# Patient Record
Sex: FEMALE | Race: OTHER | HISPANIC OR LATINO | URBAN - METROPOLITAN AREA
[De-identification: names, ages, dates, MRNs, and addresses within clinical notes are randomized per-mention and may not be internally consistent; named-entity substitution may affect disease eponyms.]

---

## 2020-09-10 ENCOUNTER — EMERGENCY (EMERGENCY)
Facility: HOSPITAL | Age: 40
LOS: 1 days | Discharge: HOME | End: 2020-09-10
Attending: EMERGENCY MEDICINE | Admitting: EMERGENCY MEDICINE
Payer: COMMERCIAL

## 2020-09-10 VITALS
SYSTOLIC BLOOD PRESSURE: 148 MMHG | HEIGHT: 65 IN | OXYGEN SATURATION: 99 % | HEART RATE: 111 BPM | TEMPERATURE: 98 F | WEIGHT: 205.03 LBS | RESPIRATION RATE: 18 BRPM | DIASTOLIC BLOOD PRESSURE: 103 MMHG

## 2020-09-10 VITALS
RESPIRATION RATE: 18 BRPM | HEART RATE: 90 BPM | OXYGEN SATURATION: 98 % | SYSTOLIC BLOOD PRESSURE: 132 MMHG | DIASTOLIC BLOOD PRESSURE: 88 MMHG

## 2020-09-10 PROCEDURE — 99242 OFF/OP CONSLTJ NEW/EST SF 20: CPT

## 2020-09-10 PROCEDURE — 99283 EMERGENCY DEPT VISIT LOW MDM: CPT

## 2020-09-10 NOTE — ED PROVIDER NOTE - NSFOLLOWUPINSTRUCTIONS_ED_ALL_ED_FT
Nondisplaced Fibular Ankle Fracture Treated With Immobilization, Adult     A nondisplaced fibular ankle fracture is a simple break of the bottom of the fibula (lateral malleolus). The fibula is a bone in the lower leg, between the knee and the foot. In a nondisplaced fracture, the pieces of the broken bone line up with each other and are not out of place. This condition usually does not need surgery and can be treated with a splint or cast.  What are the causes?  This condition may be caused by:  A hard, direct hit (blow) or injury to the side of the leg.A powerful twisting or rotating movement.Rolling the ankle.Falling or tripping.What increases the risk?  You are more likely to develop this condition if:  You play sports that involve a lot of running and pivoting, such as basketball.You play impact sports, such as football or soccer.You smoke.You have diabetes.You have a history of ankle fractures.You are obese.What are the signs or symptoms?  Symptoms of this condition include:  Severe pain that begins immediately after the injury.Bruising.Swelling.Inability to put weight on the injured ankle.An ankle that is tender to the touch.How is this diagnosed?  This condition is diagnosed based on:  Your medical history.A physical exam.Imaging tests to confirm the fracture and to evaluate the extent of the injury. These tests may include:  X-rays.Stress X-ray. During this test, your health care provider will put pressure on your ankle while taking an X-ray. This will help to determine whether your ankle is stable.CT scan.MRI.How is this treated?  This condition may be treated with:  A splint.Icing and raising (elevating) the ankle.A cast.A removable cast or walking "boot."Crutches. These may be needed to help you get around.Follow these instructions at home:  Managing pain, stiffness, and swelling        If directed, put ice on the injured area.  If you have a removable splint or cast, remove it as told by your health care provider.Put ice in a plastic bag.Place a towel between your skin and the bag, or between your cast and the bag.Leave the ice on for 20 minutes, 2–3 times a day.Raise (elevate) the injured area above the level of your heart while you are sitting or lying down.Move your toes often to avoid stiffness and to lessen swelling.Use crutches as directed. Resume walking without crutches as directed by your health care provider or when you are comfortable doing that.If you have a removable splint or cast:     Wear the removable splint or cast as told by your health care provider. Remove it only as told by your health care provider. Loosen the splint or cast if your toes tingle, become numb, or turn cold and blue.Keep the splint or cast clean.If the splint or cast is not waterproof:  Do not let it get wet.Cover it with a watertight covering when you take a bath or a shower.If you have a cast that cannot be removed:     Do not stick anything inside the cast to scratch your skin. Doing that increases your risk of infection.Check the skin around the cast every day. Contact your health care provider if you notice any redness, irritation, or swelling. You may put lotion on dry skin around the edges of the cast. Do not put lotion on the skin underneath the cast. Keep the cast clean.Do not break off edges or trim your cast.If the cast is not waterproof:  Do not let it get wet.Cover it with a watertight covering when you take a bath or a shower.Activity     Do exercises and stretches as told by your health care provider.Ask your health care provider when it is safe to drive if you have a cast or splint on your ankle.Do not drive or use heavy machinery while taking prescription pain medicine.General instructions        Take over-the-counter and prescription medicines only as told by your health care provider.Do not take baths, swim, or use a hot tub until your health care provider approves. Ask your health care provider if you can take showers. You may only be allowed to take sponge baths for bathing.Do not use the injured leg to support your body weight until your health care provider says that you can. Use crutches as told by your health care provider.Do not use any products that contain nicotine or tobacco, such as cigarettes and e-cigarettes. These can delay bone healing. If you need help quitting, ask your health care provider.Keep all follow-up visits as told by your health care provider. This is important.Contact a health care provider if:  Your cast gets damaged or it breaks.Your pain does not get better with medicine.Get help right away if:  You develop severe pain or more swelling in your ankle or foot that cannot be controlled with medicines.Your skin or nails below the injury turn blue or gray, feel cold, or become numb.The skin under your cast burns or stings.There is a bad smell or pus coming from under the cast.You cannot move your toes.Summary  A nondisplaced fibular ankle fracture is a simple break of the bottom of a bone in the lower leg (fibula).This condition may be treated with a splint, icing and elevation, a cast, or a removable cast or walking "boot." You may also need crutches to get around while your ankle heals.To help manage pain, stiffness, swelling, put ice on the injured area as directed by your health care provider.You should not use the injured leg to support your body weight until your health care provider says that you can. Use crutches as told by your health care provider.This information is not intended to replace advice given to you by your health care provider. Make sure you discuss any questions you have with your health care provider.

## 2020-09-10 NOTE — ED PROVIDER NOTE - OBJECTIVE STATEMENT
39 y/o F with PMHx DM (on metformin and insulin), presents to the ED c/o right ankle pain x 2 weeks s/p mechanical fall. Pt states she fell down a couple of stairs 2 weeks ago and was ambulating after the fall, and has been walking since. Denies head injury when she fell. States the pain increased and went to [facility] for evaluation where she had an xr done, was told she has a fracture, and sent here for further evaluation. Currently rates her pain 4/10. Pain is located along the lateral aspect of the right ankle, with some pain radiating into the right shin. Denies fevers, chills, numbness, tingling, changes in temperature or sensation of the right extremity, dizziness, or weakness. 41 y/o F with PMHx DM (on metformin and insulin), presents to the ED c/o right ankle pain x 2 weeks s/p mechanical fall. Pt states she fell down a couple of stairs 2 weeks ago and was ambulating after the fall, and has been walking since. Denies head injury when she fell. States the pain worsened which prompted her to go to [facility] for evaluation. Pt had an xr done, was told she has a fracture, and was sent here for further evaluation. Currently rates her pain 4/10. Pain is located along the lateral aspect of the right ankle with some pain radiating into the right shin. Denies fevers, chills, numbness, tingling, changes in temperature or sensation of the right extremity, dizziness, or weakness.

## 2020-09-10 NOTE — ED PROVIDER NOTE - CARE PROVIDER_API CALL
Hal Phillips  ORTHOPAEDIC SURGERY  [address] [address]  Phone: [phone]  Fax: [phone]  Follow Up Time:

## 2020-09-10 NOTE — ED PROVIDER NOTE - CLINICAL SUMMARY MEDICAL DECISION MAKING FREE TEXT BOX
39 y/o F with PMHx DM presents to the ED with right ankle pain s/p mechanical fall 2 weeks ago. Pt has been ambulating with pain for the past 2 weeks. Seen at [facility] earlier today, where an XR was done which showed fx of the distal fibula w/ slight displacement. Pt transferred to the ED to be seen by orthopedics. ANA WARNER. Waiting for ortho evaluation and dispo.

## 2020-09-10 NOTE — ED PROVIDER NOTE - ATTENDING CONTRIBUTION TO CARE
40F pmh DM p/w ankl pain s/p mech fall 2wk pta, ambulating. Exam w/ ankle ttp, NV intact. Concern fx, NV itact, no e/o sig dislocation

## 2020-09-10 NOTE — ED PROVIDER NOTE - PATIENT PORTAL LINK FT
You can access the FollowMyHealth Patient Portal offered by [facility] by registering at the following website: [email or web address]. By joining [email or web address]’s FollowMyHealth portal, you will also be able to view your health information using other applications (apps) compatible with our system.

## 2020-09-10 NOTE — CONSULT NOTE ADULT - SUBJECTIVE AND OBJECTIVE BOX
Orthopaedic Surgery Consult Note    For Surgeon: Dr. Funez     HPI:  40yFemale  Patient is a 40y old  Female who presents with a chief complaint of right ankle pain     HPI: Patient is 41 y/o female who reports h/o mechanical fall onto right ankle 2 weeks ago. Patient reports slip and fall down stairs and twisting her ankle. Endorses pain laterally but notes she has been able to walk on it since. she reports associated bruising, swelling at the right ankle. Patient has been treating it with ice and elevation since. Given that pain has not improved she presented to urgent care where XR revealed fracture of the fibula. Patient was then sent to ED for further work up. She denies associated numbness/tingling. Denies injury to other extremities.       Allergies    No Known Allergies    Intolerances      PAST MEDICAL & SURGICAL HISTORY:  Diabetes mellitus  No significant past surgical history    MEDICATIONS  (STANDING):    MEDICATIONS  (PRN):      Vital Signs Last 24 Hrs  T(C): 36.9 (10 Sep 2020 12:30), Max: 36.9 (10 Sep 2020 12:30)  T(F): 98.4 (10 Sep 2020 12:30), Max: 98.4 (10 Sep 2020 12:30)  HR: 90 (10 Sep 2020 12:44) (90 - 111)  BP: 132/88 (10 Sep 2020 12:44) (132/88 - 148/103)  BP(mean): --  RR: 18 (10 Sep 2020 12:44) (18 - 18)  SpO2: 98% (10 Sep 2020 12:44) (98% - 99%)    Physical Exam:    Gen: 41 y/o female, well nourished, well developed, NAD  HEENT: atraumatic, normocephalic   RESP: normal effort on room air  CV: no peripheral edema, no cyanosis, peripheral pulses present   MSK: Right ankle reveals diffuse swelling  + resolving ecchymosis right calf/right toes  + tenderness lateral distal fibula/lateral malleolus   no tenderness medial malleolus  no tenderness with inversion of the ankle   calves soft, nontender bilaterally   sensation intact to light touch bilateral lower extremities  DP 2+,  brisk capillary refill  EHL/FHL/TA/GS 5/5 bilaterally               Imaging: Right ankle XR reveals oblique fracture distal fibula with mild displacement     A/P: 40yFemale with right distal fibular fracture    Patient seen and examined in the ED by myself and Dr Funez   non operative management  camwalker  WBAT RLE with crutches for support  continue supportive care with ice/elevation to decrease swelling  OK for work   outpatient f/u with Dr Funez in 3 weeks     -Discussed with Dr. Funez     Ortho Pager [phone]

## 2020-09-10 NOTE — ED ADULT NURSE NOTE - CHIEF COMPLAINT QUOTE
pt [facility] ED [facility] of [facility] c/c RLE pain. per pt, fell x2 weeks ago and has been ambulating with OTC splint. no head injury or LOC at time of fall. [facility] with confirmed distal fibula fx. airsplint applied and crutches given, referred to [facility] for ortho and splint. coloration normal, skin warm, cap refill <3secs, sensation intact. no obvious deformity noted.

## 2020-09-10 NOTE — ED ADULT TRIAGE NOTE - CHIEF COMPLAINT QUOTE
pt arriving to ED per referral of [facility]. c/c RLE pain. per pt, fell x2 weeks ago and has been ambulating with OTC splint. pt [facility] at [facility] with confirmed distal fibula fx. airsplint applied and crutches given, referred to [facility] for ortho and splint. coloration normal, skin warm, cap refill <3secs, sensation intact. pt arriving to ED per referral of [facility]. c/c RLE pain. per pt, fell x2 weeks ago and has been ambulating with OTC splint. no head injury or LOC at time of fall. pt [facility] with confirmed distal fibula fx. airsplint applied and crutches given, referred to [facility] for ortho and splint. coloration normal, skin warm, cap refill <3secs, sensation intact. no obvious deformity noted.

## 2020-09-10 NOTE — ED PROVIDER NOTE - CHPI ED SYMPTOMS NEG
no head injury, no chills, no changes in temperature or sensation to the right extremity, no dizziness/no numbness/no tingling/no weakness/no fever

## 2020-09-10 NOTE — ED PROVIDER NOTE - MUSCULOSKELETAL, MLM
Spine appears normal, range of motion is not limited. Ecchymosis to the anterior right lower extremity; some ecchymosis to the right foot and toes; mild swelling to the lateral malleolus area; nontender at the medial malleolus; good ROM of right knee and ankle; good dp pulse; sensation intact; no right knee pain.
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
## 2020-09-14 DIAGNOSIS — S82.831D OTHER FRACTURE OF UPPER AND LOWER END OF RIGHT FIBULA, SUBSEQUENT ENCOUNTER FOR CLOSED FRACTURE WITH ROUTINE HEALING: ICD-10-CM

## 2020-09-14 DIAGNOSIS — M25.571 PAIN IN RIGHT ANKLE AND JOINTS OF RIGHT FOOT: ICD-10-CM

## 2020-09-18 PROBLEM — Z00.00 ENCOUNTER FOR PREVENTIVE HEALTH EXAMINATION: Status: ACTIVE | Noted: 2020-09-18

## 2020-09-18 PROBLEM — E11.9 TYPE 2 DIABETES MELLITUS WITHOUT COMPLICATIONS: Chronic | Status: ACTIVE | Noted: 2020-09-10

## 2020-09-24 ENCOUNTER — OUTPATIENT (OUTPATIENT)
Dept: OUTPATIENT SERVICES | Facility: HOSPITAL | Age: 40
LOS: 1 days | End: 2020-09-24
Payer: COMMERCIAL

## 2020-09-24 ENCOUNTER — RESULT REVIEW (OUTPATIENT)
Age: 40
End: 2020-09-24

## 2020-09-24 ENCOUNTER — APPOINTMENT (OUTPATIENT)
Dept: ORTHOPEDIC SURGERY | Facility: CLINIC | Age: 40
End: 2020-09-24
Payer: COMMERCIAL

## 2020-09-24 PROCEDURE — 73610 X-RAY EXAM OF ANKLE: CPT

## 2020-09-24 PROCEDURE — 99202 OFFICE O/P NEW SF 15 MIN: CPT

## 2020-09-24 PROCEDURE — 73610 X-RAY EXAM OF ANKLE: CPT | Mod: 26,RT

## 2020-09-24 NOTE — PHYSICAL EXAM
[de-identified] : Tenderness at fracture site at fibula\par Firing EHL/TA/GS\par SILT DP/SPN/tibial nerve [de-identified] : XR shows SERII pattern fibula fracture with interval healing

## 2020-09-24 NOTE — HISTORY OF PRESENT ILLNESS
[de-identified] : 40F PMH diabetes presenting to clinic s/p R ankle fx 5 weeks ago. Patient was seen in [facility] ED 3 weeks ago and was given a CAM boot. Has been tolerating ambulating well with no issues. Complaints of mild swelling towards end of day and pain with palpation of ankle. No numbness/tingling.

## 2020-10-22 ENCOUNTER — APPOINTMENT (OUTPATIENT)
Dept: ORTHOPEDIC SURGERY | Facility: CLINIC | Age: 40
End: 2020-10-22

## 2020-10-28 DIAGNOSIS — M25.579 PAIN IN UNSPECIFIED ANKLE AND JOINTS OF UNSPECIFIED FOOT: ICD-10-CM

## 2020-10-29 ENCOUNTER — APPOINTMENT (OUTPATIENT)
Dept: ORTHOPEDIC SURGERY | Facility: CLINIC | Age: 40
End: 2020-10-29
Payer: COMMERCIAL

## 2020-10-29 PROCEDURE — 99213 OFFICE O/P EST LOW 20 MIN: CPT

## 2020-10-29 NOTE — HISTORY OF PRESENT ILLNESS
[de-identified] : 40F s/p R ankle fracture 10 weeks ago, presenting for follow up. Pt sustained an SER2 distal fibula fracture which was treated nonoperatively in a CAM walker boot. Pt currently denies any pain, only endorses mild dull soreness over ankle when wearing boot. Denies numbness/tingling. Pt has been doing ROM exercises at home. Does not take anything for pain.

## 2020-10-29 NOTE — PHYSICAL EXAM
[de-identified] : General: AAOx3, NAD\par RLE: No swelling, deformity, or erythema noted over R ankle\par No TTP over Lateral or medial malleoli\par ROM 10 dorsiflexion, 60 plantar without difficulty\par SILT distally SPN/DPN/Saph/Oniel/Tib\par Motor 5/5 TA/GS/EHL\par

## 2020-10-29 NOTE — ASSESSMENT
[FreeTextEntry1] : 40F 10 weeks s/p R distal fibular fracture\par - Nonoperative treatment\par - No evidence of malunion/nonunion\par - Pt asymptomatic\par - Can remove CAM walker boot and resume daily activities\par - Home physical therapy exercises for ROM/Strength\par - RTC if needed

## 2023-08-24 NOTE — ED ADULT NURSE NOTE - IS THE PATIENT ABLE TO BE SCREENED?
Large Joint Aspiration/Injection: bilateral knee    Date/Time: 8/24/2023 1:45 PM    Performed by: Arely Gale PA-C  Authorized by: Arely Gale PA-C    Consent Done?:  Yes (Verbal)  Indications:  Pain  Site marked: the procedure site was marked    Timeout: prior to procedure the correct patient, procedure, and site was verified    Prep: patient was prepped and draped in usual sterile fashion    Local anesthesia used?: No    Local anesthetic:  Topical anesthetic    Details:  Needle Size:  22 G  Ultrasonic Guidance for needle placement?: No    Approach:  Anterolateral  Location:  Knee  Laterality:  Bilateral  Site:  Bilateral knee  Medications (Right):  30 mg ketorolac 30 mg/mL (1 mL)  Medications (Left):  30 mg ketorolac 30 mg/mL (1 mL)  Patient tolerance:  Patient tolerated the procedure well with no immediate complications     Yes